# Patient Record
Sex: FEMALE | Race: WHITE | NOT HISPANIC OR LATINO | URBAN - METROPOLITAN AREA
[De-identification: names, ages, dates, MRNs, and addresses within clinical notes are randomized per-mention and may not be internally consistent; named-entity substitution may affect disease eponyms.]

---

## 2021-09-07 ENCOUNTER — INPATIENT (INPATIENT)
Facility: HOSPITAL | Age: 21
LOS: 1 days | Discharge: HOME | DRG: 872 | End: 2021-09-09
Attending: STUDENT IN AN ORGANIZED HEALTH CARE EDUCATION/TRAINING PROGRAM | Admitting: STUDENT IN AN ORGANIZED HEALTH CARE EDUCATION/TRAINING PROGRAM
Payer: COMMERCIAL

## 2021-09-07 VITALS
HEIGHT: 64 IN | WEIGHT: 145.06 LBS | TEMPERATURE: 100 F | RESPIRATION RATE: 18 BRPM | DIASTOLIC BLOOD PRESSURE: 81 MMHG | HEART RATE: 101 BPM | OXYGEN SATURATION: 97 % | SYSTOLIC BLOOD PRESSURE: 121 MMHG

## 2021-09-07 PROCEDURE — 99285 EMERGENCY DEPT VISIT HI MDM: CPT

## 2021-09-07 RX ORDER — ONDANSETRON 8 MG/1
4 TABLET, FILM COATED ORAL ONCE
Refills: 0 | Status: COMPLETED | OUTPATIENT
Start: 2021-09-07 | End: 2021-09-07

## 2021-09-07 RX ORDER — FAMOTIDINE 10 MG/ML
20 INJECTION INTRAVENOUS ONCE
Refills: 0 | Status: COMPLETED | OUTPATIENT
Start: 2021-09-07 | End: 2021-09-07

## 2021-09-07 RX ORDER — SODIUM CHLORIDE 9 MG/ML
1000 INJECTION INTRAMUSCULAR; INTRAVENOUS; SUBCUTANEOUS ONCE
Refills: 0 | Status: COMPLETED | OUTPATIENT
Start: 2021-09-07 | End: 2021-09-07

## 2021-09-08 DIAGNOSIS — R09.89 OTHER SPECIFIED SYMPTOMS AND SIGNS INVOLVING THE CIRCULATORY AND RESPIRATORY SYSTEMS: ICD-10-CM

## 2021-09-08 DIAGNOSIS — R11.2 NAUSEA WITH VOMITING, UNSPECIFIED: ICD-10-CM

## 2021-09-08 DIAGNOSIS — R74.01 ELEVATION OF LEVELS OF LIVER TRANSAMINASE LEVELS: ICD-10-CM

## 2021-09-08 DIAGNOSIS — Z29.9 ENCOUNTER FOR PROPHYLACTIC MEASURES, UNSPECIFIED: ICD-10-CM

## 2021-09-08 DIAGNOSIS — A41.9 SEPSIS, UNSPECIFIED ORGANISM: ICD-10-CM

## 2021-09-08 DIAGNOSIS — E86.0 DEHYDRATION: ICD-10-CM

## 2021-09-08 DIAGNOSIS — B34.9 VIRAL INFECTION, UNSPECIFIED: ICD-10-CM

## 2021-09-08 LAB
ALBUMIN SERPL ELPH-MCNC: 3.6 G/DL — SIGNIFICANT CHANGE UP (ref 3.3–5)
ALBUMIN SERPL ELPH-MCNC: 4.1 G/DL — SIGNIFICANT CHANGE UP (ref 3.3–5)
ALP SERPL-CCNC: 133 U/L — HIGH (ref 40–120)
ALP SERPL-CCNC: 148 U/L — HIGH (ref 40–120)
ALT FLD-CCNC: 168 U/L — HIGH (ref 10–45)
ALT FLD-CCNC: 172 U/L — HIGH (ref 10–45)
ANION GAP SERPL CALC-SCNC: 14 MMOL/L — SIGNIFICANT CHANGE UP (ref 5–17)
ANION GAP SERPL CALC-SCNC: 16 MMOL/L — SIGNIFICANT CHANGE UP (ref 5–17)
APPEARANCE UR: ABNORMAL
AST SERPL-CCNC: 110 U/L — HIGH (ref 10–40)
AST SERPL-CCNC: 111 U/L — HIGH (ref 10–40)
BACTERIA # UR AUTO: ABNORMAL
BASOPHILS # BLD AUTO: 0 K/UL — SIGNIFICANT CHANGE UP (ref 0–0.2)
BASOPHILS # BLD AUTO: 0.09 K/UL — SIGNIFICANT CHANGE UP (ref 0–0.2)
BASOPHILS NFR BLD AUTO: 0 % — SIGNIFICANT CHANGE UP (ref 0–2)
BASOPHILS NFR BLD AUTO: 0.9 % — SIGNIFICANT CHANGE UP (ref 0–2)
BILIRUB DIRECT SERPL-MCNC: 1.6 MG/DL — HIGH (ref 0–0.2)
BILIRUB INDIRECT FLD-MCNC: 0.5 MG/DL — SIGNIFICANT CHANGE UP (ref 0.2–1)
BILIRUB SERPL-MCNC: 2.1 MG/DL — HIGH (ref 0.2–1.2)
BILIRUB SERPL-MCNC: 2.1 MG/DL — HIGH (ref 0.2–1.2)
BILIRUB SERPL-MCNC: 4.6 MG/DL — HIGH (ref 0.2–1.2)
BILIRUB UR-MCNC: ABNORMAL
BUN SERPL-MCNC: 6 MG/DL — LOW (ref 7–23)
BUN SERPL-MCNC: 7 MG/DL — SIGNIFICANT CHANGE UP (ref 7–23)
CALCIUM SERPL-MCNC: 9 MG/DL — SIGNIFICANT CHANGE UP (ref 8.4–10.5)
CALCIUM SERPL-MCNC: 9.9 MG/DL — SIGNIFICANT CHANGE UP (ref 8.4–10.5)
CHLORIDE SERPL-SCNC: 101 MMOL/L — SIGNIFICANT CHANGE UP (ref 96–108)
CHLORIDE SERPL-SCNC: 98 MMOL/L — SIGNIFICANT CHANGE UP (ref 96–108)
CMV IGG FLD QL: >10 U/ML — HIGH
CMV IGG SERPL-IMP: POSITIVE
CMV IGM FLD-ACNC: 16 AU/ML — SIGNIFICANT CHANGE UP
CMV IGM SERPL QL: NEGATIVE — SIGNIFICANT CHANGE UP
CO2 SERPL-SCNC: 21 MMOL/L — LOW (ref 22–31)
CO2 SERPL-SCNC: 21 MMOL/L — LOW (ref 22–31)
COLOR SPEC: ABNORMAL
CREAT SERPL-MCNC: 0.7 MG/DL — SIGNIFICANT CHANGE UP (ref 0.5–1.3)
CREAT SERPL-MCNC: 0.76 MG/DL — SIGNIFICANT CHANGE UP (ref 0.5–1.3)
DIFF PNL FLD: NEGATIVE — SIGNIFICANT CHANGE UP
EOSINOPHIL # BLD AUTO: 0 K/UL — SIGNIFICANT CHANGE UP (ref 0–0.5)
EOSINOPHIL # BLD AUTO: 0 K/UL — SIGNIFICANT CHANGE UP (ref 0–0.5)
EOSINOPHIL NFR BLD AUTO: 0 % — SIGNIFICANT CHANGE UP (ref 0–6)
EOSINOPHIL NFR BLD AUTO: 0 % — SIGNIFICANT CHANGE UP (ref 0–6)
EPI CELLS # UR: 11 /HPF — HIGH
GIANT PLATELETS BLD QL SMEAR: PRESENT — SIGNIFICANT CHANGE UP
GLUCOSE SERPL-MCNC: 73 MG/DL — SIGNIFICANT CHANGE UP (ref 70–99)
GLUCOSE SERPL-MCNC: 91 MG/DL — SIGNIFICANT CHANGE UP (ref 70–99)
GLUCOSE UR QL: NEGATIVE — SIGNIFICANT CHANGE UP
HAV IGM SER-ACNC: SIGNIFICANT CHANGE UP
HBV CORE IGM SER-ACNC: SIGNIFICANT CHANGE UP
HBV SURFACE AG SER-ACNC: SIGNIFICANT CHANGE UP
HCG SERPL-ACNC: <2 MIU/ML — SIGNIFICANT CHANGE UP
HCT VFR BLD CALC: 33.7 % — LOW (ref 34.5–45)
HCT VFR BLD CALC: 38.3 % — SIGNIFICANT CHANGE UP (ref 34.5–45)
HCV AB S/CO SERPL IA: 0.13 S/CO — SIGNIFICANT CHANGE UP (ref 0–0.99)
HCV AB SERPL-IMP: SIGNIFICANT CHANGE UP
HETEROPH AB TITR SER AGGL: POSITIVE
HGB BLD-MCNC: 11.2 G/DL — LOW (ref 11.5–15.5)
HGB BLD-MCNC: 12.8 G/DL — SIGNIFICANT CHANGE UP (ref 11.5–15.5)
HSV1 IGG SER-ACNC: 33.2 INDEX — HIGH
HSV1 IGG SERPL QL IA: POSITIVE
HSV2 IGG FLD-ACNC: 0.08 INDEX — SIGNIFICANT CHANGE UP
HSV2 IGG SERPL QL IA: NEGATIVE — SIGNIFICANT CHANGE UP
HYALINE CASTS # UR AUTO: 3 /LPF — HIGH (ref 0–2)
KETONES UR-MCNC: ABNORMAL
LEUKOCYTE ESTERASE UR-ACNC: ABNORMAL
LIDOCAIN IGE QN: 45 U/L — SIGNIFICANT CHANGE UP (ref 7–60)
LYMPHOCYTES # BLD AUTO: 3.31 K/UL — HIGH (ref 1–3.3)
LYMPHOCYTES # BLD AUTO: 34.5 % — SIGNIFICANT CHANGE UP (ref 13–44)
LYMPHOCYTES # BLD AUTO: 56.2 % — HIGH (ref 13–44)
LYMPHOCYTES # BLD AUTO: 8.49 K/UL — HIGH (ref 1–3.3)
MANUAL SMEAR VERIFICATION: SIGNIFICANT CHANGE UP
MCHC RBC-ENTMCNC: 27.4 PG — SIGNIFICANT CHANGE UP (ref 27–34)
MCHC RBC-ENTMCNC: 28.4 PG — SIGNIFICANT CHANGE UP (ref 27–34)
MCHC RBC-ENTMCNC: 33.2 GM/DL — SIGNIFICANT CHANGE UP (ref 32–36)
MCHC RBC-ENTMCNC: 33.4 GM/DL — SIGNIFICANT CHANGE UP (ref 32–36)
MCV RBC AUTO: 82 FL — SIGNIFICANT CHANGE UP (ref 80–100)
MCV RBC AUTO: 85.5 FL — SIGNIFICANT CHANGE UP (ref 80–100)
METAMYELOCYTES # FLD: 0.9 % — HIGH (ref 0–0)
MONOCYTES # BLD AUTO: 0.5 K/UL — SIGNIFICANT CHANGE UP (ref 0–0.9)
MONOCYTES # BLD AUTO: 1.48 K/UL — HIGH (ref 0–0.9)
MONOCYTES NFR BLD AUTO: 5.2 % — SIGNIFICANT CHANGE UP (ref 2–14)
MONOCYTES NFR BLD AUTO: 9.8 % — SIGNIFICANT CHANGE UP (ref 2–14)
MYELOCYTES NFR BLD: 0.9 % — HIGH (ref 0–0)
NEUTROPHILS # BLD AUTO: 4.18 K/UL — SIGNIFICANT CHANGE UP (ref 1.8–7.4)
NEUTROPHILS # BLD AUTO: 4.3 K/UL — SIGNIFICANT CHANGE UP (ref 1.8–7.4)
NEUTROPHILS NFR BLD AUTO: 27.7 % — LOW (ref 43–77)
NEUTROPHILS NFR BLD AUTO: 38.8 % — LOW (ref 43–77)
NITRITE UR-MCNC: NEGATIVE — SIGNIFICANT CHANGE UP
PH UR: 7 — SIGNIFICANT CHANGE UP (ref 5–8)
PLAT MORPH BLD: NORMAL — SIGNIFICANT CHANGE UP
PLATELET # BLD AUTO: 181 K/UL — SIGNIFICANT CHANGE UP (ref 150–400)
PLATELET # BLD AUTO: 219 K/UL — SIGNIFICANT CHANGE UP (ref 150–400)
POTASSIUM SERPL-MCNC: 3.8 MMOL/L — SIGNIFICANT CHANGE UP (ref 3.5–5.3)
POTASSIUM SERPL-MCNC: 4 MMOL/L — SIGNIFICANT CHANGE UP (ref 3.5–5.3)
POTASSIUM SERPL-SCNC: 3.8 MMOL/L — SIGNIFICANT CHANGE UP (ref 3.5–5.3)
POTASSIUM SERPL-SCNC: 4 MMOL/L — SIGNIFICANT CHANGE UP (ref 3.5–5.3)
PROT SERPL-MCNC: 6.7 G/DL — SIGNIFICANT CHANGE UP (ref 6–8.3)
PROT SERPL-MCNC: 7.6 G/DL — SIGNIFICANT CHANGE UP (ref 6–8.3)
PROT UR-MCNC: SIGNIFICANT CHANGE UP
RBC # BLD: 3.94 M/UL — SIGNIFICANT CHANGE UP (ref 3.8–5.2)
RBC # BLD: 4.67 M/UL — SIGNIFICANT CHANGE UP (ref 3.8–5.2)
RBC # FLD: 12.7 % — SIGNIFICANT CHANGE UP (ref 10.3–14.5)
RBC # FLD: 12.7 % — SIGNIFICANT CHANGE UP (ref 10.3–14.5)
RBC BLD AUTO: NORMAL — SIGNIFICANT CHANGE UP
RBC CASTS # UR COMP ASSIST: 7 /HPF — HIGH (ref 0–4)
SARS-COV-2 RNA SPEC QL NAA+PROBE: SIGNIFICANT CHANGE UP
SODIUM SERPL-SCNC: 135 MMOL/L — SIGNIFICANT CHANGE UP (ref 135–145)
SODIUM SERPL-SCNC: 136 MMOL/L — SIGNIFICANT CHANGE UP (ref 135–145)
SP GR SPEC: 1.01 — SIGNIFICANT CHANGE UP (ref 1.01–1.02)
UROBILINOGEN FLD QL: ABNORMAL
VARIANT LYMPHS # BLD: 4.5 % — SIGNIFICANT CHANGE UP (ref 0–6)
VZV IGG FLD QL IA: 127.8 INDEX — SIGNIFICANT CHANGE UP
VZV IGG FLD QL IA: NEGATIVE — SIGNIFICANT CHANGE UP
WBC # BLD: 15.1 K/UL — HIGH (ref 3.8–10.5)
WBC # BLD: 9.6 K/UL — SIGNIFICANT CHANGE UP (ref 3.8–10.5)
WBC # FLD AUTO: 15.1 K/UL — HIGH (ref 3.8–10.5)
WBC # FLD AUTO: 9.6 K/UL — SIGNIFICANT CHANGE UP (ref 3.8–10.5)
WBC UR QL: 9 /HPF — HIGH (ref 0–5)

## 2021-09-08 PROCEDURE — 76705 ECHO EXAM OF ABDOMEN: CPT | Mod: 26,RT

## 2021-09-08 PROCEDURE — 12345: CPT | Mod: NC

## 2021-09-08 PROCEDURE — 99223 1ST HOSP IP/OBS HIGH 75: CPT

## 2021-09-08 PROCEDURE — 99233 SBSQ HOSP IP/OBS HIGH 50: CPT

## 2021-09-08 RX ORDER — INFLUENZA VIRUS VACCINE 15; 15; 15; 15 UG/.5ML; UG/.5ML; UG/.5ML; UG/.5ML
0.5 SUSPENSION INTRAMUSCULAR ONCE
Refills: 0 | Status: DISCONTINUED | OUTPATIENT
Start: 2021-09-08 | End: 2021-09-09

## 2021-09-08 RX ORDER — SODIUM CHLORIDE 9 MG/ML
1000 INJECTION INTRAMUSCULAR; INTRAVENOUS; SUBCUTANEOUS
Refills: 0 | Status: DISCONTINUED | OUTPATIENT
Start: 2021-09-08 | End: 2021-09-08

## 2021-09-08 RX ORDER — METOCLOPRAMIDE HCL 10 MG
10 TABLET ORAL EVERY 8 HOURS
Refills: 0 | Status: DISCONTINUED | OUTPATIENT
Start: 2021-09-08 | End: 2021-09-09

## 2021-09-08 RX ORDER — ONDANSETRON 8 MG/1
4 TABLET, FILM COATED ORAL EVERY 8 HOURS
Refills: 0 | Status: DISCONTINUED | OUTPATIENT
Start: 2021-09-08 | End: 2021-09-09

## 2021-09-08 RX ORDER — ACETAMINOPHEN 500 MG
650 TABLET ORAL EVERY 6 HOURS
Refills: 0 | Status: DISCONTINUED | OUTPATIENT
Start: 2021-09-08 | End: 2021-09-09

## 2021-09-08 RX ORDER — SODIUM CHLORIDE 9 MG/ML
1000 INJECTION INTRAMUSCULAR; INTRAVENOUS; SUBCUTANEOUS
Refills: 0 | Status: DISCONTINUED | OUTPATIENT
Start: 2021-09-08 | End: 2021-09-09

## 2021-09-08 RX ORDER — PROCHLORPERAZINE MALEATE 5 MG
10 TABLET ORAL ONCE
Refills: 0 | Status: COMPLETED | OUTPATIENT
Start: 2021-09-08 | End: 2021-09-08

## 2021-09-08 RX ORDER — METOCLOPRAMIDE HCL 10 MG
10 TABLET ORAL ONCE
Refills: 0 | Status: COMPLETED | OUTPATIENT
Start: 2021-09-08 | End: 2021-09-08

## 2021-09-08 RX ORDER — ONDANSETRON 8 MG/1
4 TABLET, FILM COATED ORAL ONCE
Refills: 0 | Status: COMPLETED | OUTPATIENT
Start: 2021-09-08 | End: 2021-09-08

## 2021-09-08 RX ADMIN — SODIUM CHLORIDE 1000 MILLILITER(S): 9 INJECTION INTRAMUSCULAR; INTRAVENOUS; SUBCUTANEOUS at 01:19

## 2021-09-08 RX ADMIN — Medication 10 MILLIGRAM(S): at 11:13

## 2021-09-08 RX ADMIN — Medication 10 MILLIGRAM(S): at 16:14

## 2021-09-08 RX ADMIN — SODIUM CHLORIDE 80 MILLILITER(S): 9 INJECTION INTRAMUSCULAR; INTRAVENOUS; SUBCUTANEOUS at 23:26

## 2021-09-08 RX ADMIN — ONDANSETRON 4 MILLIGRAM(S): 8 TABLET, FILM COATED ORAL at 00:19

## 2021-09-08 RX ADMIN — ONDANSETRON 4 MILLIGRAM(S): 8 TABLET, FILM COATED ORAL at 01:22

## 2021-09-08 RX ADMIN — SODIUM CHLORIDE 80 MILLILITER(S): 9 INJECTION INTRAMUSCULAR; INTRAVENOUS; SUBCUTANEOUS at 10:37

## 2021-09-08 RX ADMIN — FAMOTIDINE 20 MILLIGRAM(S): 10 INJECTION INTRAVENOUS at 00:19

## 2021-09-08 RX ADMIN — Medication 30 MILLILITER(S): at 00:19

## 2021-09-08 RX ADMIN — Medication 10 MILLIGRAM(S): at 03:51

## 2021-09-08 RX ADMIN — SODIUM CHLORIDE 1000 MILLILITER(S): 9 INJECTION INTRAMUSCULAR; INTRAVENOUS; SUBCUTANEOUS at 00:19

## 2021-09-08 RX ADMIN — ONDANSETRON 4 MILLIGRAM(S): 8 TABLET, FILM COATED ORAL at 17:39

## 2021-09-08 RX ADMIN — Medication 10 MILLIGRAM(S): at 23:26

## 2021-09-08 NOTE — ED PROVIDER NOTE - OBJECTIVE STATEMENT
22 y/o F with no significant PMH presenting to the ED c/o nausea. Reports worsening nausea over the past 4 days and a few episodes of vomiting. States it looks like there is "bile" in her vomit. She saw her PMD today who thought her eyes looked yellow and recommended her to come to the ED. States she has been drinking large amounts of fluid but her urine appears dark. Denies dysuria or hematuria. No diarrhea. Denies any focal abdominal pain. Denies fever at home. No chest pain or shortness of breath. No vaginal bleeding, states she is on OCPs, unsure of last period.

## 2021-09-08 NOTE — H&P ADULT - ASSESSMENT
21F with mo reported PMH p/w nausea and inability to tolerate PO in past few days with prodrome of fevers, LAD, sore throat; found to have elevated LFTs, admitted for further work-up.  21F with no reported PMH p/w nausea and inability to tolerate PO in past few days with prodrome of fevers, LAD, sore throat; found to have elevated LFTs, admitted for further work-up.

## 2021-09-08 NOTE — H&P ADULT - NSHPLABSRESULTS_GEN_ALL_CORE
Labs, imaging and EKG personally reviewed and interpreted by me.   Labs notable for leukocytosis 15, normal Hb, normal electrolytes, LFTs notable for T bili 4.6, AST//172 with mildly elevated AlkP   Imaging personally reviewed and interpreted by me - US without e/o gall stones  EKG ordered.                           12.8   15.10 )-----------( 219      ( 08 Sep 2021 00:13 )             38.3         135  |  98  |  6<L>  ----------------------------<  91  4.0   |  21<L>  |  0.70    Ca    9.9      08 Sep 2021 00:13    TPro  7.6  /  Alb  4.1  /  TBili  4.6<H>  /  DBili  x   /  AST  111<H>  /  ALT  172<H>  /  AlkPhos  148<H>              Urinalysis Basic - ( 08 Sep 2021 01:46 )    Color: Dark Yellow / Appearance: Slightly Turbid / S.011 / pH: x  Gluc: x / Ketone: Moderate  / Bili: Moderate / Urobili: 8 mg/dL   Blood: x / Protein: Trace / Nitrite: Negative   Leuk Esterase: Large / RBC: 7 /hpf / WBC 9 /HPF   Sq Epi: x / Non Sq Epi: 11 /hpf / Bacteria: Few    < from: US Abdomen Upper Quadrant Right (21 @ 01:05) >    LIVER: 17.2 cm in length. Unremarkable.  BILIARY: No intrahepatic or extrahepatic biliary dilatation. Visualized common bile duct measuring up to 0.3 cm.  GALLBLADDER: No gallstone, significant gallbladder wall thickening or pericholecystic fluid. Negative sonographic Woody sign. Patient was not premedicated.  PANCREAS: Poorly visualized due to bowel gas.  RIGHT KIDNEY: 10.4 cm in length. No hydronephrosis or obvious renal stone.  ADDITIONAL: No ascites.    IMPRESSION:    No cholelithiasis or sonographic evidence of acute cholecystitis.    < end of copied text >

## 2021-09-08 NOTE — H&P ADULT - NSHPREVIEWOFSYSTEMS_GEN_ALL_CORE
REVIEW OF SYSTEMS:    CONSTITUTIONAL: +weakness, +fevers, +chills  EYES/ENT: No visual changes;  +throat pain, +LAD   NECK: No pain or stiffness  RESPIRATORY: No cough, no shortness of breath  CARDIOVASCULAR: No chest pain or palpitations  GASTROINTESTINAL: +nausea, +vomiting, +abdominal pain, no BRBPR  GENITOURINARY: no polyuria, no dysuria,  NEUROLOGICAL: no numbness, +headaches, no confusion   MUSCULOSKELETAL: no back pain, no focal weakness   SKIN: No itching, burning, rashes, or lesions   PSYCH: no anxiety, depression  HEME: no gum bleeding, no bruising

## 2021-09-08 NOTE — ED PROVIDER NOTE - PROGRESS NOTE DETAILS
RUQ U/S without signs of cholecystitis, bile duct normal caliber. Labs significant for evated LFTs and bili, urine with +bili and urobilinogen. Suspect acute hepatitis. Hep panel and mono sent. Patient unable to tolerate PO after zofran x 2 and reglan, will admit to the hospitalist. Ti Crenshaw, DO PGY3

## 2021-09-08 NOTE — H&P ADULT - PROBLEM SELECTOR PLAN 2
pt with intractable nausea and vomiting; was given zofran and reglan in ED and still unable to tolerate PO at this time   in setting of likely viral disease as above   c/w alternating zofran and reglan for now  IVF hydration   diet as tolerated septic with leukocytosis and tachycardia, likely in setting of viral syndrome as above    improved tachycardia, will f/u repeat CBC in AM  c/w supportive care with IVF hydration, tylenol, antiemetics as needed   mentating well, extremities warm and well perfused

## 2021-09-08 NOTE — H&P ADULT - TIME BILLING
reviewing chart, direct patient interaction and examination, discussion and obtaining collateral from family and coordination of care with ED team. Case d/w [facility].

## 2021-09-08 NOTE — H&P ADULT - HISTORY OF PRESENT ILLNESS
21F with mo reported PMH p/w nausea and inability to tolerate PO. Pt states approx 4 days PTA, she started noticing low grade fevers, sore throat, myalgias and headache, followed by swollen lymph nodes in her neck. In the days after, she started having constant nausea with approx 3 episodes of emesis per day, possible bilious per pt report. Associated with intermittent moderate intensity diffuse abdominal pain; denies any diarrhea or constipation, has noted her urine has been much darker in past few days, denies any dysuria or hematuria. Has not been able to keep down any fluids or solids in past 1-2 days. Her  and mother noted her eyes appeared more yellow today which prompted her to go to her PMD who sent her to ED for further evaluation.      Currently taking only birth control. Denies any excessive tylenol use, denies any herbal supplement use/OTC medication use, denies drug use, denies excessive etoh use. Has never had similar symptoms in the past.   21F with no reported PMH p/w nausea and inability to tolerate PO. Pt states approx 4 days PTA, she started noticing low grade fevers, sore throat, myalgias and headache, followed by swollen lymph nodes in her neck. In the days after, she started having constant nausea with approx 3 episodes of emesis per day, possible bilious per pt report. Associated with intermittent moderate intensity diffuse abdominal pain; denies any diarrhea or constipation, has noted her urine has been much darker in past few days, denies any dysuria or hematuria. Has not been able to keep down any fluids or solids in past 1-2 days. Her  and mother noted her eyes appeared more yellow today which prompted her to go to her PMD who sent her to ED for further evaluation.      Currently taking only birth control. Denies any excessive tylenol use, denies any herbal supplement use/OTC medication use, denies drug use, denies excessive etoh use. Has never had similar symptoms in the past.

## 2021-09-08 NOTE — H&P ADULT - PROBLEM SELECTOR PLAN 4
low risk for VTE -  defer pharm AC pt with intractable nausea and vomiting; was given zofran and reglan in ED and still unable to tolerate PO at this time   in setting of likely viral disease as above   c/w alternating zofran and reglan for now  IVF hydration   diet as tolerated

## 2021-09-08 NOTE — PROGRESS NOTE ADULT - SUBJECTIVE AND OBJECTIVE BOX
[facility] Division of Hospital Medicine  Tiffany Goodman   Pager (M-F, 8A-5P): [phone]  Other Times:  [phone]    Patient is a 21y old  Female who presents with a chief complaint of nausea (08 Sep 2021 06:35)      SUBJECTIVE / OVERNIGHT EVENTS: admitted overnight. Patient feeling a little bit better than before, less nauseous. Has not eaten since yesterday morning.   ADDITIONAL REVIEW OF SYSTEMS: negative    MEDICATIONS  (STANDING):  sodium chloride 0.9%. 1000 milliLiter(s) (80 mL/Hr) IV Continuous <Continuous>    MEDICATIONS  (PRN):  acetaminophen   Tablet .. 650 milliGRAM(s) Oral every 6 hours PRN Temp greater or equal to 38.5C (101.3F), Mild Pain (1 - 3)  metoclopramide Injectable 10 milliGRAM(s) IV Push every 8 hours PRN nausea  ondansetron Injectable 4 milliGRAM(s) IV Push every 8 hours PRN Nausea and/or Vomiting      CAPILLARY BLOOD GLUCOSE        I&O's Summary      PHYSICAL EXAM:  Vital Signs Last 24 Hrs  T(C): 36.9 (08 Sep 2021 08:51), Max: 37.7 (07 Sep 2021 23:26)  T(F): 98.5 (08 Sep 2021 08:51), Max: 99.9 (07 Sep 2021 23:26)  HR: 56 (08 Sep 2021 08:51) (56 - 101)  BP: 95/59 (08 Sep 2021 08:51) (95/59 - 127/78)  BP(mean): --  RR: 18 (08 Sep 2021 08:51) (17 - 22)  SpO2: 97% (08 Sep 2021 08:51) (96% - 100%)    CONSTITUTIONAL: Well-groomed, in no apparent distress  EYES: No conjunctival or scleral injection, non-icteric; PERRLA and symmetric  ENMT: No external nasal lesions; no pharyngeal injection or exudates, oral mucosa with moist membranes  NECK: Trachea midline without palpable neck mass; thyroid not enlarged and non-tender  RESPIRATORY: Breathing comfortably; lungs CTA without wheeze/rhonchi/rales  CARDIOVASCULAR: +S1S2, RRR, no M/G/R; pedal pulses full and symmetric; no lower extremity edema  GASTROINTESTINAL: No palpable masses or tenderness, +BS throughout, no rebound/guarding; no hepatosplenomegaly; no hernia palpated  LYMPHATIC: No cervical LAD or tenderness; no axillary LAD or tenderness  MUSCULOSKELETAL: no digital clubbing or cyanosis; no paraspinal tenderness; normal strength and tone of extremities  SKIN: No rashes or ulcers noted; no subcutaneous nodules or induration palpable  NEUROLOGIC: CN II-XII intact; sensation intact in LEs b/l to light touch  PSYCHIATRIC: A+O x 3; mood and affect appropriate; appropriate insight and judgment    LABS:                        12.8   15.10 )-----------( 219      ( 08 Sep 2021 00:13 )             38.3         135  |  98  |  6<L>  ----------------------------<  91  4.0   |  21<L>  |  0.70    Ca    9.9      08 Sep 2021 00:13    TPro  7.6  /  Alb  4.1  /  TBili  4.6<H>  /  DBili  x   /  AST  111<H>  /  ALT  172<H>  /  AlkPhos  148<H>            Urinalysis Basic - ( 08 Sep 2021 01:46 )    Color: Dark Yellow / Appearance: Slightly Turbid / S.011 / pH: x  Gluc: x / Ketone: Moderate  / Bili: Moderate / Urobili: 8 mg/dL   Blood: x / Protein: Trace / Nitrite: Negative   Leuk Esterase: Large / RBC: 7 /hpf / WBC 9 /HPF   Sq Epi: x / Non Sq Epi: 11 /hpf / Bacteria: Few

## 2021-09-08 NOTE — H&P ADULT - PROBLEM SELECTOR PLAN 3
pt appears clinically dehydrated   c/w IVF hydration   diet as tolerated suspect viral illness as above   check EBV, CMV, hepatitis panel, herpes panel   supportive care

## 2021-09-08 NOTE — H&P ADULT - NSHPPHYSICALEXAM_GEN_ALL_CORE
Vital Signs Last 24 Hrs  T(C): 36.9 (08 Sep 2021 06:30), Max: 37.7 (07 Sep 2021 23:26)  T(F): 98.4 (08 Sep 2021 06:30), Max: 99.9 (07 Sep 2021 23:26)  HR: 84 (08 Sep 2021 06:30) (84 - 101)  BP: 115/69 (08 Sep 2021 06:30) (115/63 - 127/78)  BP(mean): --  RR: 17 (08 Sep 2021 06:30) (17 - 22)  SpO2: 100% (08 Sep 2021 06:30) (97% - 100%)    PHYSICAL EXAM:  GENERAL: fatigued, well-developed  HEAD:  Atraumatic, normocephalic  EYES: EOMI, conjunctiva and sclera clear  NECK: Supple, no JVD  CHEST/LUNG: Clear to auscultation bilaterally; no wheezing or rales  HEART: Regular rate and rhythm; no murmurs  ABDOMEN: Soft, nontender, nondistended; bowel sounds present  EXTREMITIES:  2+ Peripheral Pulses, no edema  PSYCH: calm affect, not anxious  NEUROLOGY: non-focal, AAOx3  SKIN: No rashes or lesions  MUSCULOSKELETAL: no joint swelling, no joint tenderness

## 2021-09-08 NOTE — ED PROVIDER NOTE - CLINICAL SUMMARY MEDICAL DECISION MAKING FREE TEXT BOX
22 y/o F with no significant PMH presenting to the ED c/o nausea. Patient mildly tachycardic on arrival. Actively nauseous. Exam with mild RUQ tenderness. Plan for labs, RUQ U/S, nausea control. Will reassess following labs and imaging. Low suspicion for cholecystitis, will reassess abdomen following labs and imaging. Ti Crenshaw, DO PGY3 22 y/o F with no significant PMH presenting to the ED c/o nausea. Patient mildly tachycardic on arrival. Actively nauseous. Exam with mild RUQ tenderness. Plan for labs, RUQ U/S, nausea control. Will reassess following labs and imaging. Low suspicion for cholecystitis, will reassess abdomen following labs and imaging. Ti Crenshaw, DO PGY3    MARIETTA Ball, Attending: reviewed resident note.    Healthy 21 yoF, on OCPs, recent neg covid/strep testing presenting with 3-4 days of nausea, fatigue, and now vomiting (non bloody). Denies any focal abd pain, fever, urinary sx (however does appear dark). No etoh abuse or surgical hx. Does not think she is preg. No vag bleeding/discharge. No cough/dyspnea. Here with .     Looks uncomfortable but non toxic. No abd ttp on my exam (+RUQ appreciated by resident). Tachy, regular. Lungs CTA. Non lateralizing neuro exam. Tacky mucous membranes. No jaundice. No rashes.    Suspect viral gastro vs Covid (although less likely given neg testing). Also plan to eval for pancreatitis, hepatobiliary dz. R/o preg. No concern for appy or pelvic pathology given no low abd/pelvic pain or ttp. Plan for supportive care, labs, RUQ sono and dispo accordingly. No signs of shock state/

## 2021-09-08 NOTE — ED ADULT NURSE NOTE - OBJECTIVE STATEMENT
pt presents to the ED complaining of nausea and vomiting for the past four days, pt states she went to go see her PMD and referred her to the ED after he believed her eyes to look "yellow" on arrival pt skin and eye color within normal limits, pt actively vomiting on assessment, stating she is throwing up "acid" and that her throat is burning her, pt also complaining of dark yellow urine after drinking "plenty of fluid", Denies dysuria or hematuria. No diarrhea. Denies any focal abdominal pain. Denies fever at home. No chest pain or shortness of breath. No vaginal bleeding, states she is on OCPs, unsure of last period

## 2021-09-08 NOTE — ED PROVIDER NOTE - PHYSICAL EXAMINATION
GENERAL: Awake, alert, obvious mild discomfort from nausea  HEENT: NC/AT, moist mucous membranes, no scleral icterus appreciated  LUNGS: CTAB, no wheezes or crackles   CARDIAC: tachycardic, regular rhythm, no m/r/g  ABDOMEN: Soft, normal BS, mild RUQ tenderness, non distended, no rebound, no guarding  BACK: No midline spinal tenderness, no CVA tenderness  EXT: No edema, no calf tenderness, 2+ DP pulses bilaterally, no deformities.  NEURO: A&Ox3. Moving all extremities.  SKIN: Warm and dry. No rash.  PSYCH: Normal affect.

## 2021-09-08 NOTE — H&P ADULT - PROBLEM SELECTOR PLAN 1
pt with elevated LFT, with predominantly elevated Tbili 4.6 with more minimally elevated AST//172 and AlkP 148. ddx includes obstructive pathology vs medication effects vs toxic ingestion vs viral syndrome vs autoimmune process    -RUQ US showing no e/o GB pathology, ductal dilatation, or stones. Liver WNL, no e/o hepatomegaly. Pt only on both control, denies etoh/drug use, denies supplements/OTC medication use.   - given prodrome of fevers, myalgias, sore throat and LAD, suspect transaminitis may be in setting of viral syndrome, ie EBV, CMV infection   - check acute hepatitis panel, although lower suspicion given relatively mild AST/ALT elevation   - check EBV and CMV titers  - if viral work up unrevealing and LFTs not improving, would consider further autoimmune work up

## 2021-09-09 VITALS
HEART RATE: 82 BPM | DIASTOLIC BLOOD PRESSURE: 69 MMHG | OXYGEN SATURATION: 99 % | SYSTOLIC BLOOD PRESSURE: 111 MMHG | TEMPERATURE: 98 F | RESPIRATION RATE: 18 BRPM

## 2021-09-09 LAB
ALBUMIN SERPL ELPH-MCNC: 3.5 G/DL — SIGNIFICANT CHANGE UP (ref 3.3–5)
ALP SERPL-CCNC: 148 U/L — HIGH (ref 40–120)
ALT FLD-CCNC: 259 U/L — HIGH (ref 10–45)
ANION GAP SERPL CALC-SCNC: 18 MMOL/L — HIGH (ref 5–17)
AST SERPL-CCNC: 201 U/L — HIGH (ref 10–40)
BASOPHILS # BLD AUTO: 0 K/UL — SIGNIFICANT CHANGE UP (ref 0–0.2)
BASOPHILS NFR BLD AUTO: 0 % — SIGNIFICANT CHANGE UP (ref 0–2)
BILIRUB SERPL-MCNC: 1.6 MG/DL — HIGH (ref 0.2–1.2)
BUN SERPL-MCNC: 7 MG/DL — SIGNIFICANT CHANGE UP (ref 7–23)
CALCIUM SERPL-MCNC: 9 MG/DL — SIGNIFICANT CHANGE UP (ref 8.4–10.5)
CHLORIDE SERPL-SCNC: 101 MMOL/L — SIGNIFICANT CHANGE UP (ref 96–108)
CO2 SERPL-SCNC: 17 MMOL/L — LOW (ref 22–31)
COVID-19 SPIKE DOMAIN AB INTERP: NEGATIVE — SIGNIFICANT CHANGE UP
COVID-19 SPIKE DOMAIN ANTIBODY RESULT: 0.4 U/ML — SIGNIFICANT CHANGE UP
CREAT SERPL-MCNC: 0.67 MG/DL — SIGNIFICANT CHANGE UP (ref 0.5–1.3)
CULTURE RESULTS: SIGNIFICANT CHANGE UP
EOSINOPHIL # BLD AUTO: 0 K/UL — SIGNIFICANT CHANGE UP (ref 0–0.5)
EOSINOPHIL NFR BLD AUTO: 0 % — SIGNIFICANT CHANGE UP (ref 0–6)
GLUCOSE BLDC GLUCOMTR-MCNC: 114 MG/DL — HIGH (ref 70–99)
GLUCOSE SERPL-MCNC: 68 MG/DL — LOW (ref 70–99)
HCT VFR BLD CALC: 32.9 % — LOW (ref 34.5–45)
HGB BLD-MCNC: 10.8 G/DL — LOW (ref 11.5–15.5)
INR BLD: 1.04 RATIO — SIGNIFICANT CHANGE UP (ref 0.88–1.16)
LYMPHOCYTES # BLD AUTO: 43 % — SIGNIFICANT CHANGE UP (ref 13–44)
LYMPHOCYTES # BLD AUTO: 5.2 K/UL — HIGH (ref 1–3.3)
MAGNESIUM SERPL-MCNC: 1.9 MG/DL — SIGNIFICANT CHANGE UP (ref 1.6–2.6)
MANUAL SMEAR VERIFICATION: SIGNIFICANT CHANGE UP
MCHC RBC-ENTMCNC: 27.8 PG — SIGNIFICANT CHANGE UP (ref 27–34)
MCHC RBC-ENTMCNC: 32.8 GM/DL — SIGNIFICANT CHANGE UP (ref 32–36)
MCV RBC AUTO: 84.8 FL — SIGNIFICANT CHANGE UP (ref 80–100)
MONOCYTES # BLD AUTO: 1.33 K/UL — HIGH (ref 0–0.9)
MONOCYTES NFR BLD AUTO: 11 % — SIGNIFICANT CHANGE UP (ref 2–14)
NEUTROPHILS # BLD AUTO: 2.78 K/UL — SIGNIFICANT CHANGE UP (ref 1.8–7.4)
NEUTROPHILS NFR BLD AUTO: 18 % — LOW (ref 43–77)
NEUTS BAND # BLD: 5 % — SIGNIFICANT CHANGE UP (ref 0–8)
NRBC # BLD: 0 /100 — SIGNIFICANT CHANGE UP (ref 0–0)
PHOSPHATE SERPL-MCNC: 2.5 MG/DL — SIGNIFICANT CHANGE UP (ref 2.5–4.5)
PLAT MORPH BLD: NORMAL — SIGNIFICANT CHANGE UP
PLATELET # BLD AUTO: 196 K/UL — SIGNIFICANT CHANGE UP (ref 150–400)
POTASSIUM SERPL-MCNC: 3.9 MMOL/L — SIGNIFICANT CHANGE UP (ref 3.5–5.3)
POTASSIUM SERPL-SCNC: 3.9 MMOL/L — SIGNIFICANT CHANGE UP (ref 3.5–5.3)
PROT SERPL-MCNC: 6.6 G/DL — SIGNIFICANT CHANGE UP (ref 6–8.3)
PROTHROM AB SERPL-ACNC: 12.4 SEC — SIGNIFICANT CHANGE UP (ref 10.6–13.6)
RBC # BLD: 3.88 M/UL — SIGNIFICANT CHANGE UP (ref 3.8–5.2)
RBC # FLD: 12.9 % — SIGNIFICANT CHANGE UP (ref 10.3–14.5)
RBC BLD AUTO: SIGNIFICANT CHANGE UP
SARS-COV-2 IGG+IGM SERPL QL IA: 0.4 U/ML — SIGNIFICANT CHANGE UP
SARS-COV-2 IGG+IGM SERPL QL IA: NEGATIVE — SIGNIFICANT CHANGE UP
SODIUM SERPL-SCNC: 136 MMOL/L — SIGNIFICANT CHANGE UP (ref 135–145)
SPECIMEN SOURCE: SIGNIFICANT CHANGE UP
VARIANT LYMPHS # BLD: 23 % — HIGH (ref 0–6)
WBC # BLD: 12.1 K/UL — HIGH (ref 3.8–10.5)
WBC # FLD AUTO: 12.1 K/UL — HIGH (ref 3.8–10.5)

## 2021-09-09 PROCEDURE — 86308 HETEROPHILE ANTIBODY SCREEN: CPT

## 2021-09-09 PROCEDURE — 86695 HERPES SIMPLEX TYPE 1 TEST: CPT

## 2021-09-09 PROCEDURE — 96375 TX/PRO/DX INJ NEW DRUG ADDON: CPT

## 2021-09-09 PROCEDURE — 84100 ASSAY OF PHOSPHORUS: CPT

## 2021-09-09 PROCEDURE — 87086 URINE CULTURE/COLONY COUNT: CPT

## 2021-09-09 PROCEDURE — 82962 GLUCOSE BLOOD TEST: CPT

## 2021-09-09 PROCEDURE — 87635 SARS-COV-2 COVID-19 AMP PRB: CPT

## 2021-09-09 PROCEDURE — 86696 HERPES SIMPLEX TYPE 2 TEST: CPT

## 2021-09-09 PROCEDURE — 76705 ECHO EXAM OF ABDOMEN: CPT

## 2021-09-09 PROCEDURE — 99233 SBSQ HOSP IP/OBS HIGH 50: CPT

## 2021-09-09 PROCEDURE — 82248 BILIRUBIN DIRECT: CPT

## 2021-09-09 PROCEDURE — 83690 ASSAY OF LIPASE: CPT

## 2021-09-09 PROCEDURE — 96361 HYDRATE IV INFUSION ADD-ON: CPT

## 2021-09-09 PROCEDURE — 80053 COMPREHEN METABOLIC PANEL: CPT

## 2021-09-09 PROCEDURE — 85610 PROTHROMBIN TIME: CPT

## 2021-09-09 PROCEDURE — 84702 CHORIONIC GONADOTROPIN TEST: CPT

## 2021-09-09 PROCEDURE — 80074 ACUTE HEPATITIS PANEL: CPT

## 2021-09-09 PROCEDURE — 99239 HOSP IP/OBS DSCHRG MGMT >30: CPT

## 2021-09-09 PROCEDURE — 86645 CMV ANTIBODY IGM: CPT

## 2021-09-09 PROCEDURE — 85025 COMPLETE CBC W/AUTO DIFF WBC: CPT

## 2021-09-09 PROCEDURE — 83735 ASSAY OF MAGNESIUM: CPT

## 2021-09-09 PROCEDURE — 86769 SARS-COV-2 COVID-19 ANTIBODY: CPT

## 2021-09-09 PROCEDURE — 93005 ELECTROCARDIOGRAM TRACING: CPT

## 2021-09-09 PROCEDURE — 82247 BILIRUBIN TOTAL: CPT

## 2021-09-09 PROCEDURE — 86663 EPSTEIN-BARR ANTIBODY: CPT

## 2021-09-09 PROCEDURE — 99285 EMERGENCY DEPT VISIT HI MDM: CPT | Mod: 25

## 2021-09-09 PROCEDURE — 96374 THER/PROPH/DIAG INJ IV PUSH: CPT

## 2021-09-09 PROCEDURE — 81001 URINALYSIS AUTO W/SCOPE: CPT

## 2021-09-09 PROCEDURE — 86787 VARICELLA-ZOSTER ANTIBODY: CPT

## 2021-09-09 PROCEDURE — 86644 CMV ANTIBODY: CPT

## 2021-09-09 PROCEDURE — 86665 EPSTEIN-BARR CAPSID VCA: CPT

## 2021-09-09 PROCEDURE — 86664 EPSTEIN-BARR NUCLEAR ANTIGEN: CPT

## 2021-09-09 RX ORDER — SODIUM CHLORIDE 9 MG/ML
1000 INJECTION, SOLUTION INTRAVENOUS
Refills: 0 | Status: DISCONTINUED | OUTPATIENT
Start: 2021-09-09 | End: 2021-09-09

## 2021-09-09 RX ORDER — CALCIUM CARBONATE 500(1250)
1 TABLET ORAL ONCE
Refills: 0 | Status: COMPLETED | OUTPATIENT
Start: 2021-09-09 | End: 2021-09-09

## 2021-09-09 RX ORDER — ONDANSETRON 8 MG/1
1 TABLET, FILM COATED ORAL
Qty: 45 | Refills: 0
Start: 2021-09-09 | End: 2021-09-23

## 2021-09-09 RX ORDER — FAMOTIDINE 10 MG/ML
20 INJECTION INTRAVENOUS DAILY
Refills: 0 | Status: DISCONTINUED | OUTPATIENT
Start: 2021-09-09 | End: 2021-09-09

## 2021-09-09 RX ORDER — FAMOTIDINE 10 MG/ML
1 INJECTION INTRAVENOUS
Qty: 30 | Refills: 0
Start: 2021-09-09 | End: 2021-10-08

## 2021-09-09 RX ORDER — LANOLIN ALCOHOL/MO/W.PET/CERES
3 CREAM (GRAM) TOPICAL AT BEDTIME
Refills: 0 | Status: DISCONTINUED | OUTPATIENT
Start: 2021-09-09 | End: 2021-09-09

## 2021-09-09 RX ADMIN — ONDANSETRON 4 MILLIGRAM(S): 8 TABLET, FILM COATED ORAL at 00:55

## 2021-09-09 RX ADMIN — Medication 3 MILLIGRAM(S): at 01:00

## 2021-09-09 RX ADMIN — SODIUM CHLORIDE 75 MILLILITER(S): 9 INJECTION, SOLUTION INTRAVENOUS at 10:39

## 2021-09-09 RX ADMIN — FAMOTIDINE 20 MILLIGRAM(S): 10 INJECTION INTRAVENOUS at 14:41

## 2021-09-09 RX ADMIN — SODIUM CHLORIDE 80 MILLILITER(S): 9 INJECTION INTRAMUSCULAR; INTRAVENOUS; SUBCUTANEOUS at 10:26

## 2021-09-09 RX ADMIN — ONDANSETRON 4 MILLIGRAM(S): 8 TABLET, FILM COATED ORAL at 12:17

## 2021-09-09 RX ADMIN — Medication 1 TABLET(S): at 00:54

## 2021-09-09 RX ADMIN — Medication 10 MILLIGRAM(S): at 10:12

## 2021-09-09 NOTE — DISCHARGE NOTE NURSING/CASE MANAGEMENT/SOCIAL WORK - NSDCPEFALRISK_GEN_ALL_CORE
For information on Fall & injury Prevention, visit [email or web address]

## 2021-09-09 NOTE — CONSULT NOTE ADULT - ASSESSMENT
21F with no reported PMH p/w nausea and inability to tolerate PO in past few days with prodrome of fevers, LAD, sore throat; found to have elevated LFTs, admitted for further work-up.     COVID negative  HCG negative    #Elevated LFTs with nausea with prodrome of myalgias, low grade fever, sore throat and LAD  Lymphocytosis on differential and +infectious Mono screen - suspect EBV associated transaminitis  no herbal or OTC supplement use, no insect/tick bites  Acute viral hepatitis screen negative  CMV IgM negative and IgG + c/w previous infection  RUQ US -liver and GB WNL no biliary dilation, no gallstones  TBili improving and expect AP and transaminases to normalize over next few weeks  -follow up EBV titers  - supportive care  -PO as tolearte, encouraged rest and oral hydration  -Zofran PRN (can d/c on PO tabs)  -no GI objection to d/c; outpt follow up with Dr Nish Romeo in 1-2 weeks     Discussed with pt and mother (over phone per pt request); all questions answered at bedside    Medicine NP updated  Dr Romeo updated    Thank you for the courtesy of this consult.    Errol Jean-Baptiste PA-C    [facility]  [phone]  After hours and weekend coverage [phone]

## 2021-09-09 NOTE — PROGRESS NOTE ADULT - PROBLEM SELECTOR PLAN 1
2/2 Mono- stable w/ downtrending bilis    -RUQ US showing no e/o GB pathology, ductal dilatation, or stones. Liver WNL, no e/o hepatomegaly. Pt only on birth control, denies etoh/drug use, denies supplements/OTC medication use.   - given prodrome of fevers, myalgias, sore throat and LAD, suspect transaminitis may be in setting of viral syndrome- Mono positive
pt with elevated LFT, with predominantly elevated Tbili 4.6 with more minimally elevated AST//172 and AlkP 148. ddx includes obstructive pathology vs medication effects vs toxic ingestion vs viral syndrome vs autoimmune process    -RUQ US showing no e/o GB pathology, ductal dilatation, or stones. Liver WNL, no e/o hepatomegaly. Pt only on birth control, denies etoh/drug use, denies supplements/OTC medication use.   - given prodrome of fevers, myalgias, sore throat and LAD, suspect transaminitis may be in setting of viral syndrome, ie EBV, CMV infection   - acute hepatitis panel negative  - check for CMV, HSV, EBV, VZV

## 2021-09-09 NOTE — CONSULT NOTE ADULT - ATTENDING COMMENTS
ABnormal lfts improved probably due to infectious mononucleosis, abdominal us normal liver.no abdominal pain, tolerating po.    plan; continue management as above.            monitor lfts

## 2021-09-09 NOTE — PROGRESS NOTE ADULT - PROBLEM SELECTOR PLAN 4
pt with intractable nausea and vomiting; was given zofran and reglan in ED and still unable to tolerate PO at this time   in setting of likely viral disease as above   c/w alternating zofran and reglan for now  IVF hydration until able to take PO   diet as tolerated
Will DC when tolerating pO

## 2021-09-09 NOTE — DISCHARGE NOTE PROVIDER - NSDCCPCAREPLAN_GEN_ALL_CORE_FT
PRINCIPAL DISCHARGE DIAGNOSIS  Diagnosis: Transaminitis  Assessment and Plan of Treatment: You will need to follow up with your gastroenterologist within one week of discharge.  At this appointment, you will need your liver function test drawn to monitor.      SECONDARY DISCHARGE DIAGNOSES  Diagnosis: Infectious mononucleosis  Assessment and Plan of Treatment: Symptomatic care  Encourage rest  No contact sports  You will need to follow up with your primary medical doctor within one week of discharge.    Diagnosis: Intractable nausea and vomiting  Assessment and Plan of Treatment: Zofran as needed     PRINCIPAL DISCHARGE DIAGNOSIS  Diagnosis: Transaminitis  Assessment and Plan of Treatment: You will need to follow up with your gastroenterologist within one week of discharge.  At this appointment, you will need your liver function test drawn to monitor.      SECONDARY DISCHARGE DIAGNOSES  Diagnosis: Infectious mononucleosis  Assessment and Plan of Treatment: Symptomatic care  Encourage rest  No contact sports for 3 weeks  You will need to follow up with your primary medical doctor within one week of discharge.    Diagnosis: Intractable nausea and vomiting  Assessment and Plan of Treatment: Zofran as needed     PRINCIPAL DISCHARGE DIAGNOSIS  Diagnosis: Transaminitis  Assessment and Plan of Treatment: You will need to follow up with your gastroenterologist within one week of discharge.  At this appointment, you will need your liver function test drawn to monitor.  You will also need to discuss the results of your Bev Porter serology test.      SECONDARY DISCHARGE DIAGNOSES  Diagnosis: Infectious mononucleosis  Assessment and Plan of Treatment: Symptomatic care  Encourage rest  No contact sports for 3 weeks  You will need to follow up with your primary medical doctor within one week of discharge.    Diagnosis: Intractable nausea and vomiting  Assessment and Plan of Treatment: Zofran as needed

## 2021-09-09 NOTE — PROGRESS NOTE ADULT - PROBLEM SELECTOR PLAN 2
septic with leukocytosis and tachycardia, likely in setting of viral syndrome as above    improved tachycardia, will f/u repeat CBC in AM  c/w supportive care with IVF hydration, tylenol, antiemetics as needed   mentating well, extremities warm and well perfused
viral sepsis 2/2 Mono  Discussed with lab- Mono test is heterophile test per core lab

## 2021-09-09 NOTE — PROGRESS NOTE ADULT - SUBJECTIVE AND OBJECTIVE BOX
PROGRESS NOTE:   Shelby Nevarez DO  Hospitalist  Pager [phone]  After 5pm/weekends or if no answer ext: [phone]      Patient is a 21y old  Female who presents with a chief complaint of nausea (09 Sep 2021 11:01)      SUBJECTIVE / OVERNIGHT EVENTS:  Still not eating much, but overall feeling the same    ADDITIONAL REVIEW OF SYSTEMS:  no fever or chills  no n/v/d    MEDICATIONS  (STANDING):  dextrose 5% + sodium chloride 0.9%. 1000 milliLiter(s) (75 mL/Hr) IV Continuous <Continuous>  influenza   Vaccine 0.5 milliLiter(s) IntraMuscular once  melatonin 3 milliGRAM(s) Oral at bedtime    MEDICATIONS  (PRN):  acetaminophen   Tablet .. 650 milliGRAM(s) Oral every 6 hours PRN Temp greater or equal to 38.5C (101.3F), Mild Pain (1 - 3)  metoclopramide Injectable 10 milliGRAM(s) IV Push every 8 hours PRN nausea  ondansetron Injectable 4 milliGRAM(s) IV Push every 8 hours PRN Nausea and/or Vomiting      CAPILLARY BLOOD GLUCOSE      POCT Blood Glucose.: 114 mg/dL (09 Sep 2021 12:16)    I&O's Summary      PHYSICAL EXAM:  Vital Signs Last 24 Hrs  T(C): 36.5 (09 Sep 2021 05:48), Max: 37.1 (08 Sep 2021 15:40)  T(F): 97.7 (09 Sep 2021 05:48), Max: 98.7 (08 Sep 2021 15:40)  HR: 92 (09 Sep 2021 05:48) (80 - 92)  BP: 102/65 (09 Sep 2021 05:48) (101/63 - 103/69)  BP(mean): --  RR: 18 (09 Sep 2021 05:48) (18 - 18)  SpO2: 95% (09 Sep 2021 05:48) (95% - 97%)    CONSTITUTIONAL: NAD, well-developed; non toxic   LYmphadenopathy: + lymhnodes  RESPIRATORY: Normal respiratory effort; lungs are clear to auscultation bilaterally  CARDIOVASCULAR: Regular rate and rhythm, normal S1 and S2, no murmur/rub/gallop; No lower extremity edema; Peripheral pulses are 2+ bilaterally  ABDOMEN: Nontender to palpation, normoactive bowel sounds, no rebound/guarding; No hepatosplenomegaly  MUSCLOSKELETAL: no clubbing or cyanosis of digits; no joint swelling or tenderness to palpation  PSYCH: A+O to person, place, and time; affect appropriate    LABS:                        10.8   12.10 )-----------( 196      ( 09 Sep 2021 07:07 )             32.9         136  |  101  |  7   ----------------------------<  68<L>  3.9   |  17<L>  |  0.67    Ca    9.0      09 Sep 2021 07:07  Phos  2.5       Mg     1.9         TPro  6.6  /  Alb  3.5  /  TBili  1.6<H>  /  DBili  x   /  AST  201<H>  /  ALT  259<H>  /  AlkPhos  148<H>      PT/INR - ( 09 Sep 2021 07:07 )   PT: 12.4 sec;   INR: 1.04 ratio               Urinalysis Basic - ( 08 Sep 2021 01:46 )    Color: Dark Yellow / Appearance: Slightly Turbid / S.011 / pH: x  Gluc: x / Ketone: Moderate  / Bili: Moderate / Urobili: 8 mg/dL   Blood: x / Protein: Trace / Nitrite: Negative   Leuk Esterase: Large / RBC: 7 /hpf / WBC 9 /HPF   Sq Epi: x / Non Sq Epi: 11 /hpf / Bacteria: Few        Culture - Urine (collected 08 Sep 2021 04:33)  Source: Clean Catch Clean Catch (Midstream)  Final Report (09 Sep 2021 00:18):    <10,000 CFU/mL Normal Urogenital Gini        RADIOLOGY & ADDITIONAL TESTS:  Results Reviewed:   Imaging Personally Reviewed:  Electrocardiogram Personally Reviewed:    COORDINATION OF CARE:  Care Discussed with Consultants/Other Providers [Y/N]:  Prior or Outpatient Records Reviewed [Y/N]:

## 2021-09-09 NOTE — PROGRESS NOTE ADULT - PROBLEM SELECTOR PLAN 6
low risk for VTE -  defer pharm AC    DC when tolerating PO- mild hypoglycemia this morning, evaluate pt and she was aysmptomatic and repeat was increased
low risk for VTE -  defer pharm AC

## 2021-09-09 NOTE — DISCHARGE NOTE PROVIDER - NSDCMRMEDTOKEN_GEN_ALL_CORE_FT
Pepcid 20 mg oral tablet: 1 tab(s) orally once a day   Zuplenz 4 mg oral disintegrating strip: 1 each orally 3 times a day   Augmentin 875 mg-125 mg oral tablet: 1 tab(s) orally every 12 hours   oxyCODONE 5 mg oral tablet: 1 tab(s) orally every 6 hours, As Needed -for severe pain MDD:4  Pepcid 20 mg oral tablet: 1 tab(s) orally once a day   Zuplenz 4 mg oral disintegrating strip: 1 each orally 3 times a day

## 2021-09-09 NOTE — DISCHARGE NOTE PROVIDER - NSDCFUADDAPPT_GEN_ALL_CORE_FT
You will need to follow up with your gastroenterologist within one week of discharge - please call to make an appointment.  At this appointment, you will need to have your liver function test drawn to monitor.    You will need to follow up with your primary medical doctor within one week of discharge - please call to make an appointment. You will need to follow up with your gastroenterologist within one week of discharge - please call to make an appointment.  At this appointment, you will need to have your liver function test drawn to monitor.    You will need to follow up with your primary medical doctor within one week of discharge - please call to make an appointment.      APPTS ARE READY TO BE MADE: [x ] YES    Best Family or Patient Contact (if needed):    Additional Information about above appointments (if needed):    1:   2:   3:     Other comments or requests:    You will need to follow up with your gastroenterologist within one week of discharge - please call to make an appointment.  At this appointment, you will need to have your liver function test drawn to monitor.    You will need to follow up with your primary medical doctor within one week of discharge - please call to make an appointment.      APPTS ARE READY TO BE MADE: [x ] YES    Best Family or Patient Contact (if needed):    Additional Information about above appointments (if needed):    1:   2:   3:     Other comments or requests:   Patient was provided with information for their gastroenterology provider and was advised to call to schedule follow up within specified time frame. They will connect with their PCP.

## 2021-09-09 NOTE — CHART NOTE - NSCHARTNOTEFT_GEN_A_CORE
Request from Dr. Nevarez to facilitate patient discharge.  Medication reconciliation reviewed, revised, and resolved with Dr. Nevarez, who has medically cleared patient for discharge with follow up as advised.  Please refer to discharge note for detailed hospital course.

## 2021-09-09 NOTE — DISCHARGE NOTE NURSING/CASE MANAGEMENT/SOCIAL WORK - PATIENT PORTAL LINK FT
You can access the FollowMyHealth Patient Portal offered by [facility] by registering at the following website: [email or web address]. By joining [facility]’s FollowMyHealth portal, you will also be able to view your health information using other applications (apps) compatible with our system.

## 2021-09-09 NOTE — DISCHARGE NOTE PROVIDER - HOSPITAL COURSE
21 year old female, with no reported PMH, p/w nausea and inability to tolerate PO. Pt states, approximately 4 days PTA, she started noticing low grade fevers, sore throat, myalgias and headache, followed by swollen lymph nodes in her neck.  In the days after, she started having constant nausea with approximately 3 episodes of emesis per day, possible bilious per patient report.  Associated with intermittent moderate intensity diffuse abdominal pain; denies any diarrhea or constipation, has noted her urine has been much darker in past few days, denies any dysuria or hematuria. Has not been able to keep down any fluids or solids in past 1-2 days. Her  and mother noted her eyes appeared more yellow today which prompted her to go to her PMD who sent her to ED for further evaluation.  On admission, patient noted to have transaminitis. Abdominal US shows no cholelithiasis or sonographic evidence of acute cholecystitis.  Infectious mononucleous screen positive.  GI consulted - patient cleared for discharge on zofran prn with outpatient GI follow up. 21 year old female, with no reported PMH, p/w nausea and inability to tolerate PO. Pt states, approximately 4 days PTA, she started noticing low grade fevers, sore throat, myalgias and headache, followed by swollen lymph nodes in her neck.  In the days after, she started having constant nausea with approximately 3 episodes of emesis per day, possible bilious per patient report.  Associated with intermittent moderate intensity diffuse abdominal pain; denies any diarrhea or constipation, has noted her urine has been much darker in past few days, denies any dysuria or hematuria. Has not been able to keep down any fluids or solids in past 1-2 days. Her  and mother noted her eyes appeared more yellow today which prompted her to go to her PMD who sent her to ED for further evaluation.  On admission, patient noted to have transaminitis. Abdominal US shows no cholelithiasis or sonographic evidence of acute cholecystitis.  Infectious mononucleous screen positive.  GI consulted - patient cleared for discharge on zofran prn with outpatient GI follow up.  Patient cleared for discharge, by Dr. Nevarez, with PCP and GI follow up.

## 2021-09-09 NOTE — PROGRESS NOTE ADULT - ASSESSMENT
21F with no reported PMH p/w nausea and inability to tolerate PO in past few days with prodrome of fevers, LAD, sore throat; found to have elevated LFTs, admitted for further work-up. 
21F with no reported PMH p/w nausea and inability to tolerate PO in past few days with prodrome of fevers w/ transaminitis 2/2 EBV infection.

## 2021-09-09 NOTE — PROGRESS NOTE ADULT - PROBLEM SELECTOR PLAN 3
Infectious mono  Contageous- avoid close contact  No restrictions on going back to work as tolerated from fatigue  Avoid contact sports for 3 weeks after symptom onset
suspect viral illness as above   check EBV, CMV, hepatitis panel, herpes panel   supportive care

## 2021-09-09 NOTE — DISCHARGE NOTE NURSING/CASE MANAGEMENT/SOCIAL WORK - NSDCFUADDAPPT_GEN_ALL_CORE_FT
You will need to follow up with your gastroenterologist within one week of discharge - please call to make an appointment.  At this appointment, you will need to have your liver function test drawn to monitor.    You will need to follow up with your primary medical doctor within one week of discharge - please call to make an appointment.      APPTS ARE READY TO BE MADE: [x ] YES    Best Family or Patient Contact (if needed):    Additional Information about above appointments (if needed):    1:   2:   3:     Other comments or requests:

## 2021-09-09 NOTE — DISCHARGE NOTE PROVIDER - CARE PROVIDER_API CALL
Nish Romeo  GASTROENTEROLOGY  [address], [address]  [place], [place] [id number]  Phone: [phone]  Fax: [phone]  Follow Up Time:

## 2021-09-09 NOTE — CONSULT NOTE ADULT - SUBJECTIVE AND OBJECTIVE BOX
Patient is a 21y old  Female who presented with a chief complaint of nausea (09 Sep 2021 10:03)      HPI:  21F with no reported PMH p/w nausea and inability to tolerate PO. Pt states approx 4 days PTA, she started noticing low grade fevers, sore throat, myalgias and headache, followed by swollen lymph nodes in her neck. In the days after, she started having constant nausea with approx 3 episodes of emesis per day, possible bilious per pt report. Associated with intermittent moderate intensity diffuse abdominal pain; denies any diarrhea or constipation, has noted her urine has been much darker in past few days, denies any dysuria or hematuria. Has not been able to keep down any fluids or solids in past 1-2 days. Her  and mother noted her eyes appeared more yellow today which prompted her to go to her PMD who sent her to ED for further evaluation.      Currently taking only birth control. Denies any excessive tylenol use, denies any herbal supplement use/OTC medication use, denies drug use, denies excessive etoh use. Has never had similar symptoms in the past.   (08 Sep 2021 06:35)    no abdominal pain  tolerating PO diet but still with nausea, no emesis  denies any OTC or herbal supplements, no excessive tylenol use no ETOH abuse or binge drinking  no recent insect/tick bites  no rash  takes birth control pills - HCG negative on admission and RUQ US negative    feeling better, still some fatigue and nausea  no fever or chills  no diarrhea    eager to go home      PAST MEDICAL & SURGICAL HISTORY:  No pertinent past medical history    No significant past surgical history        Allergies  No Known Allergies        MEDICATIONS  (STANDING):  dextrose 5% + sodium chloride 0.9%. 1000 milliLiter(s) (75 mL/Hr) IV Continuous <Continuous>  influenza   Vaccine 0.5 milliLiter(s) IntraMuscular once  melatonin 3 milliGRAM(s) Oral at bedtime    MEDICATIONS  (PRN):  acetaminophen   Tablet .. 650 milliGRAM(s) Oral every 6 hours PRN Temp greater or equal to 38.5C (101.3F), Mild Pain (1 - 3)  metoclopramide Injectable 10 milliGRAM(s) IV Push every 8 hours PRN nausea  ondansetron Injectable 4 milliGRAM(s) IV Push every 8 hours PRN Nausea and/or Vomiting      Social History:  , lives with spouse  non smoker  no vaping  no ETOH abuse  no drug use      Health Management  Last Colonoscopy - NONE      Advanced Directives: (   X  ) None    (      ) DNR    (     ) DNI    (     ) Health Care Proxy:     Review of Systems:    General:  No wt loss, chills, night sweats, +fatigue, +low grade fevers +myalgias  CV:  No pain, palpitations, hypo/hypertension  Resp:  No dyspnea, cough, tachypnea, wheezing  GI:  see HPI  :  No pain, bleeding, incontinence, nocturia +dark urine - now getting lighter in color  Muscle:  No pain, weakness +myalgias  Neuro:  No focal weakness, tingling, memory problems  Psych:  No fatigue, insomnia, mood problems, depression  Endocrine:  No polyuria, polydypsia, cold/heat intolerance  Heme:  No petechiae, ecchymosis, easy bruisability  Skin:  No rash, tattoos, scars, edema      Vital Signs Last 24 Hrs  T(C): 36.5 (09 Sep 2021 05:48), Max: 37.1 (08 Sep 2021 15:40)  T(F): 97.7 (09 Sep 2021 05:48), Max: 98.7 (08 Sep 2021 15:40)  HR: 92 (09 Sep 2021 05:48) (80 - 92)  BP: 102/65 (09 Sep 2021 05:48) (101/63 - 103/69)  BP(mean): --  RR: 18 (09 Sep 2021 05:48) (18 - 18)  SpO2: 95% (09 Sep 2021 05:48) (95% - 97%)    PHYSICAL EXAM:    Constitutional: NAD, well-developed non toxic appearing WF    HEENT: NCAT anicteric sclera EOMI pupils equal and reactive, +small subconjunctival hemorrhage right eye  Neck: No LAD, supple no JVD  Respiratory: clear b/l no accessory muscle use  Cardiovascular: S1 and S2, RRR  Gastrointestinal: BS+, soft, NT/ND, neg HSM, negative Woody's sign  Extremities: No peripheral edema, neg clubbing, cyanosis  Vascular: 2+ peripheral pulses  Neurological: A/O x 3, no focal deficits  Psychiatric: Normal mood, normal affect  Skin: No rashes        LABS:                        10.8   12.10 )-----------( 196      ( 09 Sep 2021 07:07 )             32.9   Reactive Lymphocytes %: 23.0 % (21 @ 07:07)         136  |  101  |  7   ----------------------------<  68<L>  3.9   |  17<L>  |  0.67    Ca    9.0      09 Sep 2021 07:07  Phos  2.5       Mg     1.9         Lipase, Serum: 45 U/L (21 @ 00:13)   HCG Quantitative, Serum (21 @ 00:13)   HCG Quantitative, Serum: <2.0    TPro  6.6  /  Alb  3.5  /  TBili  1.6<H>  /  DBili  x   /  AST  201<H>  /  ALT  259<H>  /  AlkPhos  148<H>      Bilirubin Total, Serum: 1.6 mg/dL (21 @ 07:07)   Bilirubin Total, Serum: 2.1 mg/dL (21 @ 12:35)   Bilirubin Total, Serum: 2.1 mg/dL (21 @ 12:35)   Bilirubin Total, Serum: 4.6 mg/dL (21 @ 00:13)   Alkaline Phosphatase, Serum: 148 U/L (21 @ 07:07)   Alkaline Phosphatase, Serum: 133 U/L (21 @ 12:35)   Alkaline Phosphatase, Serum: 148 U/L (21 @ 00:13)   Aspartate Aminotransferase (AST/SGOT): 201 U/L (21 @ 07:07)   Aspartate Aminotransferase (AST/SGOT): 110 U/L (21 @ 12:35)   Aspartate Aminotransferase (AST/SGOT): 111 U/L (21 @ 00:13)     Herpes simplex (1,2) IgG, Serum (21 @ 15:02)   Herpes simplex 1 IgG Ab Result: 33.20 Index   Herpes simplex 1 IgG Ab Interp: Positive  Herpes simplex 2 IgG Ab Result: 0.08 Index   Herpes simplex 2 IgG Ab Interp: Negative:     Varicella Zoster IgG Antibody (21 @ 15:02)   Varicella IgG Antibody Result: 127.8 Index   Varicella IgG Interpretation: Negative    Cytomegalovirus IgG Antibody, Serum (21 @ 15:02)   CMV IgG Antibody: >10.00 U/mL   CMV IgG Interpretation: Positive  Cytomegalovirus IgM Antibody, Serum (21 @ 15:02)   CMV IgM Antibody: 16.0 AU/mL   CMV IgM Interpretation: Negative    Infectious Mononucleosis Screen (21 @ 04:52)   Infectious Mononucleosis Screen: Positive    Acute Hepatitis Panel (21 @ 06:22)   Hepatitis C Virus Interpretation: Nonreact:   Hepatitis C Virus S/CO Ratio: 0.13 S/CO   Hepatitis B Core IgM Antibody: Nonreact   Hepatitis B Surface Antigen: Nonreact   Hepatitis A IgM Antibody: Nonreact     COVID-19 PCR . (21 @ 00:45)   COVID-19 PCR: NotDetec:       PT/INR - ( 09 Sep 2021 07:07 )   PT: 12.4 sec;   INR: 1.04 ratio           Urinalysis Basic - ( 08 Sep 2021 01:46 )    Color: Dark Yellow / Appearance: Slightly Turbid / S.011 / pH: x  Gluc: x / Ketone: Moderate  / Bili: Moderate / Urobili: 8 mg/dL   Blood: x / Protein: Trace / Nitrite: Negative   Leuk Esterase: Large / RBC: 7 /hpf / WBC 9 /HPF   Sq Epi: x / Non Sq Epi: 11 /hpf / Bacteria: Few      RADIOLOGY & ADDITIONAL TESTS:  EXAM:  US ABDOMEN RT UPR QUADRANT                        PROCEDURE DATE:  2021        INTERPRETATION:  CLINICAL INDICATION: Right upper quadrant pain.    TECHNIQUE: Targeted right upper quadrant ultrasound of the abdomen was performed.    COMPARISON: None.    FINDINGS: This study was technically difficult due to bowel gas.    LIVER: 17.2 cm in length. Unremarkable.  BILIARY: No intrahepatic or extrahepatic biliary dilatation. Visualized common bile duct measuring up to 0.3 cm.  GALLBLADDER: No gallstone, significant gallbladder wall thickening or pericholecystic fluid. Negative sonographic Woody sign. Patient was not premedicated.  PANCREAS: Poorly visualized due to bowel gas.  RIGHT KIDNEY: 10.4 cm in length. No hydronephrosis or obvious renal stone.  ADDITIONAL: No ascites.    IMPRESSION:    No cholelithiasis or sonographic evidence of acute cholecystitis.    --- End of Report ---

## 2021-09-10 LAB
EBV EA AB SER IA-ACNC: 10.3 U/ML — HIGH
EBV EA AB TITR SER IF: NEGATIVE — SIGNIFICANT CHANGE UP
EBV EA IGG SER-ACNC: ABNORMAL
EBV NA IGG SER IA-ACNC: <3 U/ML — SIGNIFICANT CHANGE UP
EBV PATRN SPEC IB-IMP: SIGNIFICANT CHANGE UP
EBV VCA IGG AVIDITY SER QL IA: POSITIVE
EBV VCA IGM SER IA-ACNC: 28.3 U/ML — HIGH
EBV VCA IGM SER IA-ACNC: >160 U/ML — HIGH
EBV VCA IGM TITR FLD: POSITIVE
VZV IGM SER-ACNC: 0.98 INDEX — HIGH (ref 0–0.9)

## 2021-09-14 LAB
HSV1 AB FLD QL: NEGATIVE — SIGNIFICANT CHANGE UP
HSV2 AB FLD-ACNC: NEGATIVE — SIGNIFICANT CHANGE UP

## 2021-09-16 LAB — CMV DNA CSF QL NAA+PROBE: SIGNIFICANT CHANGE UP

## 2021-11-01 ENCOUNTER — EMERGENCY (EMERGENCY)
Facility: HOSPITAL | Age: 21
LOS: 1 days | Discharge: HOME | End: 2021-11-01
Attending: EMERGENCY MEDICINE
Payer: COMMERCIAL

## 2021-11-01 VITALS
HEART RATE: 94 BPM | DIASTOLIC BLOOD PRESSURE: 60 MMHG | RESPIRATION RATE: 18 BRPM | SYSTOLIC BLOOD PRESSURE: 106 MMHG | OXYGEN SATURATION: 98 %

## 2021-11-01 VITALS
HEIGHT: 64 IN | WEIGHT: 139.99 LBS | HEART RATE: 90 BPM | TEMPERATURE: 98 F | SYSTOLIC BLOOD PRESSURE: 103 MMHG | DIASTOLIC BLOOD PRESSURE: 70 MMHG | RESPIRATION RATE: 18 BRPM | OXYGEN SATURATION: 99 %

## 2021-11-01 PROBLEM — Z78.9 OTHER SPECIFIED HEALTH STATUS: Chronic | Status: ACTIVE | Noted: 2021-09-08

## 2021-11-01 LAB
ALBUMIN SERPL ELPH-MCNC: 4.5 G/DL — SIGNIFICANT CHANGE UP (ref 3.3–5)
ALP SERPL-CCNC: 74 U/L — SIGNIFICANT CHANGE UP (ref 40–120)
ALT FLD-CCNC: 14 U/L — SIGNIFICANT CHANGE UP (ref 10–45)
ANION GAP SERPL CALC-SCNC: 13 MMOL/L — SIGNIFICANT CHANGE UP (ref 5–17)
APPEARANCE UR: CLEAR — SIGNIFICANT CHANGE UP
AST SERPL-CCNC: 11 U/L — SIGNIFICANT CHANGE UP (ref 10–40)
BACTERIA # UR AUTO: NEGATIVE — SIGNIFICANT CHANGE UP
BASE EXCESS BLDV CALC-SCNC: 1.2 MMOL/L — SIGNIFICANT CHANGE UP (ref -2–2)
BASOPHILS # BLD AUTO: 0.03 K/UL — SIGNIFICANT CHANGE UP (ref 0–0.2)
BASOPHILS NFR BLD AUTO: 0.2 % — SIGNIFICANT CHANGE UP (ref 0–2)
BILIRUB SERPL-MCNC: 0.6 MG/DL — SIGNIFICANT CHANGE UP (ref 0.2–1.2)
BILIRUB UR-MCNC: NEGATIVE — SIGNIFICANT CHANGE UP
BLD GP AB SCN SERPL QL: NEGATIVE — SIGNIFICANT CHANGE UP
BUN SERPL-MCNC: 5 MG/DL — LOW (ref 7–23)
CA-I SERPL-SCNC: 1.22 MMOL/L — SIGNIFICANT CHANGE UP (ref 1.15–1.33)
CALCIUM SERPL-MCNC: 9.2 MG/DL — SIGNIFICANT CHANGE UP (ref 8.4–10.5)
CHLORIDE BLDV-SCNC: 103 MMOL/L — SIGNIFICANT CHANGE UP (ref 96–108)
CHLORIDE SERPL-SCNC: 103 MMOL/L — SIGNIFICANT CHANGE UP (ref 96–108)
CO2 BLDV-SCNC: 29 MMOL/L — HIGH (ref 22–26)
CO2 SERPL-SCNC: 22 MMOL/L — SIGNIFICANT CHANGE UP (ref 22–31)
COLOR SPEC: YELLOW — SIGNIFICANT CHANGE UP
CREAT SERPL-MCNC: 0.64 MG/DL — SIGNIFICANT CHANGE UP (ref 0.5–1.3)
DIFF PNL FLD: NEGATIVE — SIGNIFICANT CHANGE UP
EOSINOPHIL # BLD AUTO: 0.01 K/UL — SIGNIFICANT CHANGE UP (ref 0–0.5)
EOSINOPHIL NFR BLD AUTO: 0.1 % — SIGNIFICANT CHANGE UP (ref 0–6)
EPI CELLS # UR: 3 /HPF — SIGNIFICANT CHANGE UP
GAS PNL BLDV: 137 MMOL/L — SIGNIFICANT CHANGE UP (ref 136–145)
GAS PNL BLDV: SIGNIFICANT CHANGE UP
GAS PNL BLDV: SIGNIFICANT CHANGE UP
GLUCOSE BLDV-MCNC: 88 MG/DL — SIGNIFICANT CHANGE UP (ref 70–99)
GLUCOSE SERPL-MCNC: 95 MG/DL — SIGNIFICANT CHANGE UP (ref 70–99)
GLUCOSE UR QL: NEGATIVE — SIGNIFICANT CHANGE UP
HCG SERPL-ACNC: <2 MIU/ML — SIGNIFICANT CHANGE UP
HCO3 BLDV-SCNC: 28 MMOL/L — SIGNIFICANT CHANGE UP (ref 22–29)
HCT VFR BLD CALC: 34.6 % — SIGNIFICANT CHANGE UP (ref 34.5–45)
HCT VFR BLDA CALC: 38 % — SIGNIFICANT CHANGE UP (ref 34.5–46.5)
HGB BLD CALC-MCNC: 12.6 G/DL — SIGNIFICANT CHANGE UP (ref 11.7–16.1)
HGB BLD-MCNC: 11.7 G/DL — SIGNIFICANT CHANGE UP (ref 11.5–15.5)
HYALINE CASTS # UR AUTO: 2 /LPF — SIGNIFICANT CHANGE UP (ref 0–2)
IMM GRANULOCYTES NFR BLD AUTO: 0.5 % — SIGNIFICANT CHANGE UP (ref 0–1.5)
KETONES UR-MCNC: ABNORMAL
LACTATE BLDV-MCNC: 0.9 MMOL/L — SIGNIFICANT CHANGE UP (ref 0.7–2)
LEUKOCYTE ESTERASE UR-ACNC: NEGATIVE — SIGNIFICANT CHANGE UP
LIDOCAIN IGE QN: 19 U/L — SIGNIFICANT CHANGE UP (ref 7–60)
LYMPHOCYTES # BLD AUTO: 10.9 % — LOW (ref 13–44)
LYMPHOCYTES # BLD AUTO: 2.07 K/UL — SIGNIFICANT CHANGE UP (ref 1–3.3)
MCHC RBC-ENTMCNC: 27.2 PG — SIGNIFICANT CHANGE UP (ref 27–34)
MCHC RBC-ENTMCNC: 33.8 GM/DL — SIGNIFICANT CHANGE UP (ref 32–36)
MCV RBC AUTO: 80.5 FL — SIGNIFICANT CHANGE UP (ref 80–100)
MONOCYTES # BLD AUTO: 1.3 K/UL — HIGH (ref 0–0.9)
MONOCYTES NFR BLD AUTO: 6.8 % — SIGNIFICANT CHANGE UP (ref 2–14)
NEUTROPHILS # BLD AUTO: 15.49 K/UL — HIGH (ref 1.8–7.4)
NEUTROPHILS NFR BLD AUTO: 81.5 % — HIGH (ref 43–77)
NITRITE UR-MCNC: NEGATIVE — SIGNIFICANT CHANGE UP
NRBC # BLD: 0 /100 WBCS — SIGNIFICANT CHANGE UP (ref 0–0)
PCO2 BLDV: 50 MMHG — HIGH (ref 39–42)
PH BLDV: 7.35 — SIGNIFICANT CHANGE UP (ref 7.32–7.43)
PH UR: 6.5 — SIGNIFICANT CHANGE UP (ref 5–8)
PLATELET # BLD AUTO: 280 K/UL — SIGNIFICANT CHANGE UP (ref 150–400)
PO2 BLDV: 18 MMHG — LOW (ref 25–45)
POTASSIUM BLDV-SCNC: 3.7 MMOL/L — SIGNIFICANT CHANGE UP (ref 3.5–5.1)
POTASSIUM SERPL-MCNC: 4.2 MMOL/L — SIGNIFICANT CHANGE UP (ref 3.5–5.3)
POTASSIUM SERPL-SCNC: 4.2 MMOL/L — SIGNIFICANT CHANGE UP (ref 3.5–5.3)
PROT SERPL-MCNC: 7.6 G/DL — SIGNIFICANT CHANGE UP (ref 6–8.3)
PROT UR-MCNC: ABNORMAL
RBC # BLD: 4.3 M/UL — SIGNIFICANT CHANGE UP (ref 3.8–5.2)
RBC # FLD: 12.5 % — SIGNIFICANT CHANGE UP (ref 10.3–14.5)
RBC CASTS # UR COMP ASSIST: 3 /HPF — SIGNIFICANT CHANGE UP (ref 0–4)
RH IG SCN BLD-IMP: POSITIVE — SIGNIFICANT CHANGE UP
SAO2 % BLDV: 26 % — LOW (ref 67–88)
SODIUM SERPL-SCNC: 138 MMOL/L — SIGNIFICANT CHANGE UP (ref 135–145)
SP GR SPEC: 1.03 — HIGH (ref 1.01–1.02)
UROBILINOGEN FLD QL: ABNORMAL
WBC # BLD: 18.99 K/UL — HIGH (ref 3.8–10.5)
WBC # FLD AUTO: 18.99 K/UL — HIGH (ref 3.8–10.5)
WBC UR QL: 2 /HPF — SIGNIFICANT CHANGE UP (ref 0–5)

## 2021-11-01 PROCEDURE — 85018 HEMOGLOBIN: CPT

## 2021-11-01 PROCEDURE — 85014 HEMATOCRIT: CPT

## 2021-11-01 PROCEDURE — 84702 CHORIONIC GONADOTROPIN TEST: CPT

## 2021-11-01 PROCEDURE — 74177 CT ABD & PELVIS W/CONTRAST: CPT | Mod: 26,MA

## 2021-11-01 PROCEDURE — 99284 EMERGENCY DEPT VISIT MOD MDM: CPT | Mod: 25

## 2021-11-01 PROCEDURE — 82435 ASSAY OF BLOOD CHLORIDE: CPT

## 2021-11-01 PROCEDURE — 82803 BLOOD GASES ANY COMBINATION: CPT

## 2021-11-01 PROCEDURE — 80053 COMPREHEN METABOLIC PANEL: CPT

## 2021-11-01 PROCEDURE — 84132 ASSAY OF SERUM POTASSIUM: CPT

## 2021-11-01 PROCEDURE — 96374 THER/PROPH/DIAG INJ IV PUSH: CPT | Mod: XU

## 2021-11-01 PROCEDURE — 86901 BLOOD TYPING SEROLOGIC RH(D): CPT

## 2021-11-01 PROCEDURE — 83605 ASSAY OF LACTIC ACID: CPT

## 2021-11-01 PROCEDURE — 99285 EMERGENCY DEPT VISIT HI MDM: CPT

## 2021-11-01 PROCEDURE — 74177 CT ABD & PELVIS W/CONTRAST: CPT | Mod: MA

## 2021-11-01 PROCEDURE — 86900 BLOOD TYPING SEROLOGIC ABO: CPT

## 2021-11-01 PROCEDURE — 81001 URINALYSIS AUTO W/SCOPE: CPT

## 2021-11-01 PROCEDURE — 96375 TX/PRO/DX INJ NEW DRUG ADDON: CPT

## 2021-11-01 PROCEDURE — 85025 COMPLETE CBC W/AUTO DIFF WBC: CPT

## 2021-11-01 PROCEDURE — 82330 ASSAY OF CALCIUM: CPT

## 2021-11-01 PROCEDURE — 83690 ASSAY OF LIPASE: CPT

## 2021-11-01 PROCEDURE — 82947 ASSAY GLUCOSE BLOOD QUANT: CPT

## 2021-11-01 PROCEDURE — 36415 COLL VENOUS BLD VENIPUNCTURE: CPT

## 2021-11-01 PROCEDURE — 86850 RBC ANTIBODY SCREEN: CPT

## 2021-11-01 PROCEDURE — 84295 ASSAY OF SERUM SODIUM: CPT

## 2021-11-01 RX ORDER — IBUPROFEN 200 MG
400 TABLET ORAL ONCE
Refills: 0 | Status: COMPLETED | OUTPATIENT
Start: 2021-11-01 | End: 2021-11-01

## 2021-11-01 RX ORDER — ONDANSETRON 8 MG/1
4 TABLET, FILM COATED ORAL ONCE
Refills: 0 | Status: COMPLETED | OUTPATIENT
Start: 2021-11-01 | End: 2021-11-01

## 2021-11-01 RX ORDER — MORPHINE SULFATE 50 MG/1
4 CAPSULE, EXTENDED RELEASE ORAL ONCE
Refills: 0 | Status: DISCONTINUED | OUTPATIENT
Start: 2021-11-01 | End: 2021-11-01

## 2021-11-01 RX ORDER — OXYCODONE HYDROCHLORIDE 5 MG/1
1 TABLET ORAL
Qty: 8 | Refills: 0
Start: 2021-11-01 | End: 2021-11-02

## 2021-11-01 RX ORDER — ACETAMINOPHEN 500 MG
650 TABLET ORAL ONCE
Refills: 0 | Status: COMPLETED | OUTPATIENT
Start: 2021-11-01 | End: 2021-11-01

## 2021-11-01 RX ORDER — SODIUM CHLORIDE 9 MG/ML
1000 INJECTION INTRAMUSCULAR; INTRAVENOUS; SUBCUTANEOUS ONCE
Refills: 0 | Status: COMPLETED | OUTPATIENT
Start: 2021-11-01 | End: 2021-11-01

## 2021-11-01 RX ADMIN — ONDANSETRON 4 MILLIGRAM(S): 8 TABLET, FILM COATED ORAL at 18:16

## 2021-11-01 RX ADMIN — MORPHINE SULFATE 4 MILLIGRAM(S): 50 CAPSULE, EXTENDED RELEASE ORAL at 17:59

## 2021-11-01 RX ADMIN — Medication 650 MILLIGRAM(S): at 15:56

## 2021-11-01 RX ADMIN — ONDANSETRON 4 MILLIGRAM(S): 8 TABLET, FILM COATED ORAL at 15:57

## 2021-11-01 RX ADMIN — SODIUM CHLORIDE 1000 MILLILITER(S): 9 INJECTION INTRAMUSCULAR; INTRAVENOUS; SUBCUTANEOUS at 17:41

## 2021-11-01 RX ADMIN — Medication 400 MILLIGRAM(S): at 15:56

## 2021-11-01 RX ADMIN — Medication 1 TABLET(S): at 18:55

## 2021-11-01 NOTE — ED ADULT TRIAGE NOTE - CHIEF COMPLAINT QUOTE
abd pain onset 3pm yesterday pt seen in er [facility] had ultrasound done and possible ruptured ovarian cyst pt was told pain has worsened was seen by gyn today and told to come to habve ct scan

## 2021-11-01 NOTE — ED PROVIDER NOTE - PATIENT PORTAL LINK FT
You can access the FollowMyHealth Patient Portal offered by [facility] by registering at the following website: [email or web address]. By joining [employer]’s FollowMyHealth portal, you will also be able to view your health information using other applications (apps) compatible with our system. You can access the FollowMyHealth Patient Portal offered by [facility] by registering at the following website: [email or web address]. By joining [facility]’s FollowMyHealth portal, you will also be able to view your health information using other applications (apps) compatible with our system.

## 2021-11-01 NOTE — ED PROVIDER NOTE - NSFOLLOWUPINSTRUCTIONS_ED_ALL_ED_FT
stay hydrated  bland diet  Take Tylenol 650mg every 6 hrs as needed for pain.  Take Motrin 400-600mg every 6 hrs as needed for pain. Take with food   Oxycodone 5mg every 6 hours as needed for pain not relieved by tylenol or motron. Do not drive or consume alcohol while taking.   You may need to take over the counter stool softners to prevent constipation while on this medication.  Take Augmentin 2x per day until course complete  Follow up with Dr. Tate once you have completed your antibiotics     SEEK IMMEDIATE MEDICAL CARE IF YOU HAVE ANY OF THE FOLLOWING SYMPTOMS: worsening abdominal pain, uncontrollable vomiting, profuse diarrhea, inability to have bowel movements or pass gas, black or bloody stools, fever accompanying chest pain or back pain, or fainting. These symptoms may represent a serious problem that is an emergency. Do not wait to see if the symptoms will go away. Get medical help right away. Call 911 and do not drive yourself to the hospital.

## 2021-11-01 NOTE — ED PROVIDER NOTE - CARE PROVIDER_API CALL
Nish Romeo  GASTROENTEROLOGY  [address]  [place], [place] [id number]  Phone: [phone]  Fax: [phone]  Follow Up Time:

## 2021-11-01 NOTE — ED ADULT NURSE NOTE - NSIMPLEMENTINTERV_GEN_ALL_ED
Implemented All Universal Safety Interventions:  [place] to call system. Call bell, personal items and telephone within reach. Instruct patient to call for assistance. Room bathroom lighting operational. Non-slip footwear when patient is off stretcher. Physically safe environment: no spills, clutter or unnecessary equipment. Stretcher in lowest position, wheels locked, appropriate side rails in place.

## 2021-11-01 NOTE — ED PROVIDER NOTE - PROGRESS NOTE DETAILS
reached out to Dr. Gentry Oliver regarding clarification on TVUS results done today - Georgina Peter PA-C spoke with Dr. Barajas who was covering for Dr. Oliver, states that there was no concern for torsion on US. notes that patient also had leukocytosis to 19k at hospital yesterday. requests call back with results at [phone]- Georgina Peter PA-C offered CDU for pain control, patient declines prefers to go home. went over medication recs, return precautions and recommend follow up with their family Dr. Becerra. attempted to reach GI physician at family request via answering service without success, on hold for answering service indefinitely. also discussed with Dr. Milligan partner. offered CDU for pain control, patient declines prefers to go home. went over medication recs, return precautions and recommend follow up with their family Dr. Becerra. attempted to reach GI physician at family request via answering service without success, on hold for answering service indefinitely. also called  Dr. Milligan partner at requested number with no answer and VM full unable to leave  - Georgina Peter PA-C offered CDU for pain control, patient declines prefers to go home. went over medication recs, return precautions and recommend follow up with their family Dr. Becerra sammie given FH of IBD. attempted to reach GI physician at family request via answering service without success, on hold for answering service indefinitely. also called  Dr. Milligan partner at requested number with no answer and VM full unable to leave  - Georgina Peter PA-C patient very lightheaded from morphine, will obs for short time period. tolerated pretzels PO patient feels much improved, ready for dc. - Georgina Peter PA-C

## 2021-11-01 NOTE — ED ADULT NURSE NOTE - OBJECTIVE STATEMENT
21 y F presents to the ED with abdominal pain since yesterday, severe sharp pain that is constant with intermittent cramping. patient reports she was just resting when the pain began. patient with no fever or chills. reports nausea and poor appetite without vomiting. Denies BM yesterday. On assessment, A&Ox4. Denies lightheadedness, dizziness, headaches, numbness and tingling. Breathing spontaneously and unlabored on Room air. Denies cough, SOB and CP. No Peripheral edema. Peripheral pulses strong and equal bilaterally. Denies CP, SOB and palpitations. Abdomen nondistended, negative CVA tenderness, positive bowel sounds in all four quadrants. Pt is continent. Denies dysuria, melena and hematuria. 18g IV placed by qdoc RN in RAC. Labs drawn and sent. Pt safety maintained. Call bell within reach. Side rails in upward position. Pt awaiting dispo.  Pt refusing to get into gown at this time.

## 2021-11-01 NOTE — ED PROVIDER NOTE - RAPID ASSESSMENT
21 F presents with lower abdominal pain radiating to left sided. Saw OBGYN in [place], Dr. Gee, had US done that showed no ruptured ovarian cyst. Referred pt to ER for further evaluation for possible abdominal pathology. Pt reports worsening abdominal pain. Endorsed Tylenol at 6:30AM without improvement. Denies fever, lightheadedness, dizziness. PT in NAD, appropriate, cooperative.     Scribe Statement: Hakan GARCIA Tiffany, attest that this documentation has been prepared under the direction and in the presence of Aldo Green) 21 F presents with lower abdominal pain radiating to left sided. Was seen at [facility] and had work up with ultrasound and diagnosed with left lower quadrant pain secondary to ruptured ovarian cyst. Patient then saw OBGYN in [place], Dr. Gee, had US done that showed equivocal findings for a ruptured ovarian cyst and is unsure of pathology causing patient's pain. Referred pt to ER for further evaluation for possible abdominal pathology. Pt reports worsening abdominal pain. Endorsed Tylenol at 6:30AM without improvement and ibuprofen at 0930am without much relief. Denies fever, lightheadedness, dizziness. PT in NAD, appropriate, cooperative.     Scribe Statement: IHakan Tiffany, attest that this documentation has been prepared under the direction and in the presence of Aldo Green (MD)  Aldo Green MD note: The scribe's documentation has been prepared under my direction and personally reviewed by me.  Patient was seen as a tele QDOC patient, the patient will be seen and further worked up in the main emergency department and their care will be completed by the main emergency department team along with a thorough physical exam. Receiving team will follow up on labs, analgesia, any clinical imaging, reassess and disposition as clinically indicated, all decisions regarding the progression of care will be made at their discretion.

## 2021-11-01 NOTE — ED PROVIDER NOTE - ATTENDING CONTRIBUTION TO CARE
Patient is a 20 yo F with no chronic medical problems sent in by her obgyn for evaluation of abdominal pain. Patient reports acute onset abdominal pain yesterday in her left lower abdominal/ pelvic region. She went to [facility], had an ultrasound that showed some pelvic fluid and was told she likely had a ruptured cyst. She followed up with her obgyn today who repeated the ultrasound and states patient's exam and pain are not consistent with ruptured cyst. Patient states pain is severe despite taking tylenol/ ibuprofen. No fevers, chills, nausea, vomiting, urinary symptoms. No diarrhea. Per TVUS results on patient's patient portal for [facility], normal doppler, no cysts noted, mild fluid likely physiologic stated.     VS noted  Gen. no acute distress, Non toxic   HEENT: EOMI, mmm  Lungs: CTAB/L no C/ W /R   CVS: RRR   Abd; Soft, guarding, ttp in lower abdomen with rebound, + left CVA tenderness  Ext: no edema  Skin: no rash  Neuro AAOx3 non focal clear speech  a/p: abdominal pain - plan for pain control, CT A/P, u/a and reassess.   - Jerald IQBAL Patient is a 20 yo F with no chronic medical problems sent in by her obgyn for evaluation of abdominal pain. Patient reports acute onset abdominal pain yesterday in her left lower abdominal/ pelvic region. She went to [facility], had an ultrasound that showed some pelvic fluid and was told she likely had a ruptured cyst. She followed up with her obgyn today who repeated the ultrasound and states patient's exam and pain are not consistent with ruptured cyst. Patient states pain is severe despite taking tylenol/ ibuprofen. No fevers, chills, nausea, vomiting, urinary symptoms. No diarrhea. Per TVUS results on patient's patient portal for [facility], normal doppler, no cysts noted, mild fluid likely physiologic stated.     VS noted  Gen. no acute distress, Non toxic   HEENT: EOMI, mmm  Lungs: CTAB/L no C/ W /R   CVS: RRR   Abd; Soft, guarding, ttp in lower abdomen with rebound, + left CVA tenderness  Ext: no edema  Skin: no rash  Neuro AAOx3 non focal clear speech  a/p: abdominal pain - plan for pain control, CT A/P, u/a and reassess.   Patient found to have colitis on CT. She appears to be in significant pain, offered CDU but she declined. Will d/c with pain medication, Abx, strict return precautions.   - Jerald IQBAL

## 2021-11-01 NOTE — ED PROVIDER NOTE - CLINICAL SUMMARY MEDICAL DECISION MAKING FREE TEXT BOX
Jerald IQBAL: abdominal pain - plan for pain control, CT A/P, u/a and reassess.   Patient found to have colitis on CT. She appears to be in significant pain, offered CDU but she declined. Will d/c with pain medication, Abx, strict return precautions.

## 2023-09-29 NOTE — PATIENT PROFILE ADULT - FALL HARM RISK TYPE OF ASSESSMENT
daily assessment Solaraze Counseling:  I discussed with the patient the risks of Solaraze including but not limited to erythema, scaling, itching, weeping, crusting, and pain.

## 2025-07-18 NOTE — ED ADULT NURSE NOTE - SUICIDE SCREENING DEPRESSION
Patient discharged from facility to home. Patient driven home by . Understood discharge instructions. IVs removed. Patient to  medications from perferred pharmacy.    Negative